# Patient Record
Sex: FEMALE | Race: WHITE | Employment: UNEMPLOYED | URBAN - METROPOLITAN AREA
[De-identification: names, ages, dates, MRNs, and addresses within clinical notes are randomized per-mention and may not be internally consistent; named-entity substitution may affect disease eponyms.]

---

## 2017-05-08 ENCOUNTER — TELEPHONE (OUTPATIENT)
Dept: HEMATOLOGY | Age: 62
End: 2017-05-08

## 2018-05-01 ENCOUNTER — OFFICE VISIT (OUTPATIENT)
Dept: HEMATOLOGY | Age: 63
End: 2018-05-01

## 2018-05-01 ENCOUNTER — HOSPITAL ENCOUNTER (OUTPATIENT)
Dept: LAB | Age: 63
Discharge: HOME | End: 2018-05-01
Payer: COMMERCIAL

## 2018-05-01 VITALS
HEART RATE: 66 BPM | DIASTOLIC BLOOD PRESSURE: 41 MMHG | TEMPERATURE: 98.7 F | SYSTOLIC BLOOD PRESSURE: 96 MMHG | RESPIRATION RATE: 20 BRPM | HEIGHT: 65 IN | OXYGEN SATURATION: 91 %

## 2018-05-01 DIAGNOSIS — K74.60 CIRRHOSIS OF LIVER WITHOUT ASCITES, UNSPECIFIED HEPATIC CIRRHOSIS TYPE (HCC): ICD-10-CM

## 2018-05-01 DIAGNOSIS — K74.60 CIRRHOSIS OF LIVER WITHOUT ASCITES, UNSPECIFIED HEPATIC CIRRHOSIS TYPE (HCC): Primary | ICD-10-CM

## 2018-05-01 PROBLEM — D33.2 BRAIN TUMOR (BENIGN) (HCC): Status: ACTIVE | Noted: 2018-05-01

## 2018-05-01 LAB
ALBUMIN SERPL-MCNC: 2.8 G/DL (ref 3.4–5)
ALBUMIN/GLOB SERPL: 1 {RATIO} (ref 0.8–1.7)
ALP SERPL-CCNC: 263 U/L (ref 45–117)
ALT SERPL-CCNC: 27 U/L (ref 13–56)
AMMONIA PLAS-SCNC: 54 UMOL/L (ref 11–32)
ANION GAP SERPL CALC-SCNC: 7 MMOL/L (ref 3–18)
AST SERPL-CCNC: 26 U/L (ref 15–37)
BASOPHILS # BLD: 0 K/UL (ref 0–0.06)
BASOPHILS NFR BLD: 0 % (ref 0–2)
BILIRUB DIRECT SERPL-MCNC: 0.4 MG/DL (ref 0–0.2)
BILIRUB SERPL-MCNC: 1 MG/DL (ref 0.2–1)
BUN SERPL-MCNC: 15 MG/DL (ref 7–18)
BUN/CREAT SERPL: 15 (ref 12–20)
CALCIUM SERPL-MCNC: 8.4 MG/DL (ref 8.5–10.1)
CHLORIDE SERPL-SCNC: 107 MMOL/L (ref 100–108)
CO2 SERPL-SCNC: 27 MMOL/L (ref 21–32)
CREAT SERPL-MCNC: 1.02 MG/DL (ref 0.6–1.3)
DIFFERENTIAL METHOD BLD: ABNORMAL
EOSINOPHIL # BLD: 0.4 K/UL (ref 0–0.4)
EOSINOPHIL NFR BLD: 5 % (ref 0–5)
ERYTHROCYTE [DISTWIDTH] IN BLOOD BY AUTOMATED COUNT: 14.9 % (ref 11.6–14.5)
GLOBULIN SER CALC-MCNC: 2.9 G/DL (ref 2–4)
GLUCOSE SERPL-MCNC: 77 MG/DL (ref 74–99)
HCT VFR BLD AUTO: 38.6 % (ref 35–45)
HGB BLD-MCNC: 12.6 G/DL (ref 12–16)
INR PPP: 1.2 (ref 0.8–1.2)
LYMPHOCYTES # BLD: 1.5 K/UL (ref 0.9–3.6)
LYMPHOCYTES NFR BLD: 20 % (ref 21–52)
MCH RBC QN AUTO: 32 PG (ref 24–34)
MCHC RBC AUTO-ENTMCNC: 32.6 G/DL (ref 31–37)
MCV RBC AUTO: 98 FL (ref 74–97)
MONOCYTES # BLD: 0.6 K/UL (ref 0.05–1.2)
MONOCYTES NFR BLD: 8 % (ref 3–10)
NEUTS SEG # BLD: 5.2 K/UL (ref 1.8–8)
NEUTS SEG NFR BLD: 67 % (ref 40–73)
PLATELET # BLD AUTO: 78 K/UL (ref 135–420)
PMV BLD AUTO: 10.3 FL (ref 9.2–11.8)
POTASSIUM SERPL-SCNC: 4.2 MMOL/L (ref 3.5–5.5)
PROT SERPL-MCNC: 5.7 G/DL (ref 6.4–8.2)
PROTHROMBIN TIME: 14.6 SEC (ref 11.5–15.2)
RBC # BLD AUTO: 3.94 M/UL (ref 4.2–5.3)
SODIUM SERPL-SCNC: 141 MMOL/L (ref 136–145)
WBC # BLD AUTO: 7.7 K/UL (ref 4.6–13.2)

## 2018-05-01 PROCEDURE — 85025 COMPLETE CBC W/AUTO DIFF WBC: CPT | Performed by: NURSE PRACTITIONER

## 2018-05-01 PROCEDURE — 36415 COLL VENOUS BLD VENIPUNCTURE: CPT | Performed by: NURSE PRACTITIONER

## 2018-05-01 PROCEDURE — 80076 HEPATIC FUNCTION PANEL: CPT | Performed by: NURSE PRACTITIONER

## 2018-05-01 PROCEDURE — 82107 ALPHA-FETOPROTEIN L3: CPT | Performed by: NURSE PRACTITIONER

## 2018-05-01 PROCEDURE — 80048 BASIC METABOLIC PNL TOTAL CA: CPT | Performed by: NURSE PRACTITIONER

## 2018-05-01 PROCEDURE — 82140 ASSAY OF AMMONIA: CPT | Performed by: NURSE PRACTITIONER

## 2018-05-01 PROCEDURE — 85610 PROTHROMBIN TIME: CPT | Performed by: NURSE PRACTITIONER

## 2018-05-01 RX ORDER — LACTULOSE 10 G/15ML
30 SOLUTION ORAL EVERY 6 HOURS
COMMUNITY

## 2018-05-01 NOTE — MR AVS SNAPSHOT
[facility] [place] [address] 
[phone] Patient: Wilene Rubinstein MRN: [id number] TXJ:[id number] Visit Information Date & Time Provider Department Dept. Phone Encounter #  
 5/1/2018  3:30 PM LEVY [address] [id number] Follow-up Instructions Return in about 3 months (around 8/1/2018). Upcoming Health Maintenance Date Due HEMOGLOBIN A1C Q6M 1955 LIPID PANEL Q1 1955 FOOT EXAM Q1 9/29/1965 MICROALBUMIN Q1 9/29/1965 EYE EXAM RETINAL OR DILATED Q1 9/29/1965 Pneumococcal 19-64 Medium Risk (1 of 1 - PPSV23) 9/29/1974 DTaP/Tdap/Td series (1 - Tdap) 9/29/1976 PAP AKA CERVICAL CYTOLOGY 9/29/1976 BREAST CANCER SCRN MAMMOGRAM 9/29/2005 FOBT Q 1 YEAR AGE 50-75 9/29/2005 ZOSTER VACCINE AGE 60> 7/29/2015 MEDICARE YEARLY EXAM 4/12/2018 Influenza Age 5 to Adult 8/1/2018 Allergies as of 5/1/2018  Review Complete On: 5/1/2018 By: Florencia Og Severity Noted Reaction Type Reactions Betadine [Povidone-iodine]  02/06/2012    Rash, Itching Iodine  02/06/2012    Unknown (comments) Ipratropium-albuterol  02/06/2012    Unknown (comments) Morphine  02/06/2012    Other (comments) Wheezing/SOB Penicillins  02/06/2012    Unknown (comments) Shellfish Containing Products  02/06/2012    Unknown (comments) Sulfa (Sulfonamide Antibiotics)  02/06/2012    Other (comments) Current Immunizations  Never Reviewed No immunizations on file. Not reviewed this visit You Were Diagnosed With   
  
 Codes Comments Cirrhosis of liver without ascites, unspecified hepatic cirrhosis type ([facility] [address]    -  Primary ICD-10-CM: K74.60 ICD-9-CM: 571.5 Vitals  BP Pulse Temp Resp Height(growth percentile) SpO2  
 96/41 (BP 1 Location: Right arm, BP Patient Position: Sitting) 66 98.7 °F (37.1 °C) (Tympanic) 20 5' 5\" (1.651 m) 91% OB Status Smoking Status Hysterectomy Current Every Day Smoker Preferred Pharmacy Pharmacy Name Phone [facility] [phone] Your Updated Medication List  
  
   
This list is accurate as of 5/1/18  4:19 PM.  Always use your most recent med list.  
  
  
  
  
 ALEVE 220 mg Cap Generic drug:  naproxen sodium Take  by mouth. ARICEPT 10 mg tablet Generic drug:  donepezil Take 10 mg by mouth nightly. BENTYL 20 mg tablet Generic drug:  dicyclomine Take 20 mg by mouth every six (6) hours. cloNIDine HCl 0.1 mg tablet Commonly known as:  CATAPRES Take  by mouth two (2) times a day. CYMBALTA 30 mg capsule Generic drug:  DULoxetine Take 30 mg by mouth daily. fentaNYL 100 mcg/hr PATCH Commonly known as:  DURAGESIC  
1 Patch by TransDERmal route every seventy-two (72) hours. furosemide 20 mg tablet Commonly known as:  LASIX Take 1 Tab by mouth two (2) times a day. KEPPRA 250 mg tablet Generic drug:  levETIRAcetam  
Take  by mouth two (2) times a day. KlonoPIN 0.5 mg tablet Generic drug:  clonazePAM  
Take  by mouth nightly as needed. lactulose 10 gram/15 mL (15 mL) Soln Take 30 mL by mouth. NEURONTIN 300 mg capsule Generic drug:  gabapentin Take 300 mg by mouth three (3) times daily. NexIUM 40 mg capsule Generic drug:  esomeprazole Take  by mouth daily. RESTORIL 15 mg capsule Generic drug:  temazepam  
Take  by mouth nightly as needed. SEROquel 100 mg tablet Generic drug:  QUEtiapine Take 50 mg by mouth two (2) times a day. VALIUM 10 mg tablet Generic drug:  diazePAM  
Take 10 mg by mouth every six (6) hours as needed. Follow-up Instructions Return in about 3 months (around 8/1/2018). To-Do List   
 05/01/2018 Lab:  AFP WITH AFP-L3% 05/01/2018 Lab:  AMMONIA   
  
 05/01/2018 Lab:  CBC WITH AUTOMATED DIFF   
  
 05/01/2018 Lab:  HEPATIC FUNCTION PANEL   
  
 05/01/2018 Lab:  METABOLIC PANEL, BASIC   
  
 05/01/2018 Lab:  PROTHROMBIN TIME + INR Introducing [facility]! Denisse Gamble introduces [employer] patient portal. Now you can access parts of your medical record, email your doctor's office, and request medication refills online. 1. In your internet browser, go to [email or web address] 2. Click on the First Time User? Click Here link in the Sign In box. You will see the New Member Sign Up page. 3. Enter your [employer] Access Code exactly as it appears below. You will not need to use this code after youve completed the sign-up process. If you do not sign up before the expiration date, you must request a new code. · [employer] Access Code: [email or web address] Expires: 7/30/2018  4:16 PM 
 
4. Enter the last four digits of your Social Security Number (xxxx) and Date of Birth (mm/dd/yyyy) as indicated and click Submit. You will be taken to the next sign-up page. 5. Create a [employer] ID. This will be your [employer] login ID and cannot be changed, so think of one that is secure and easy to remember. 6. Create a [employer] password. You can change your password at any time. 7. Enter your Password Reset Question and Answer. This can be used at a later time if you forget your password. 8. Enter your e-mail address. You will receive e-mail notification when new information is available in [address] 9. Click Sign Up. You can now view and download portions of your medical record. 10. Click the Download Summary menu link to download a portable copy of your medical information. If you have questions, please visit the Frequently Asked Questions section of the [employer] website. Remember, [employer] is NOT to be used for urgent needs. For medical emergencies, dial 911. Now available from your iPhone and Android! Please provide this summary of care documentation to your next provider. Your primary care clinician is listed as Zena Cordova. If you have any questions after today's visit, please call [phone].

## 2018-05-01 NOTE — PROGRESS NOTES
70 Reji Quintanilla MD, [address], [place], [place]       Cristina Mendez, LEVY Voss Cera, DIMPLE Saleh, [facility]   Tyson Catalan, LEVY Santa DepUNM [facility] 136    at [address]    [address] [place] [address], [address] [place] [address], [facility] 22.    [phone]    FAX: [address] [place] [address] Avenue    [address], [address], [address] [place] [address],[address]    [address] [place] [address] Street: [phone]           Patient Care Team:  Keisha Rangel MD as PCP - General (Internal Medicine)      Problem List  Never Reviewed          Codes Class Noted    Brain tumor (benign) [facility]) ICD-10-CM: D33.2  ICD-9-CM: 225.0  5/1/2018        Seizures ([facility] ICD-10-CM: R56.9  ICD-9-CM: 780.39  10/26/2016        Diabetes mellitus type 2, controlled ([facility] ICD-10-CM: E11.9  ICD-9-CM: 250.00  10/26/2016        HTN (hypertension) ICD-10-CM: I10  ICD-9-CM: 401.9  10/26/2016        Cirrhosis ([facility] ICD-10-CM: K74.60  ICD-9-CM: 571.5  10/26/2016        Fatigue ICD-10-CM: R53.83  ICD-9-CM: 780.79  10/26/2016        Depression ICD-10-CM: F32.9  ICD-9-CM: 839  10/26/2016        Bipolar 1 disorder ([facility] ICD-10-CM: F31.9  ICD-9-CM: 296.7  10/26/2016        Nocturia ICD-10-CM: R35.1  ICD-9-CM: 788.43  2/7/2012        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   The active problem list, all pertinent past medical history, medications, liver histology, endoscopic studies, radiologic findings and laboratory findings related to the liver disorder were reviewed with the patient. The patient has not been seen here since 10/2016. That was the only other time she has been here. The patient is a 58 y.o.   female who was first found to have chronic liver disease and cirrhosis in 2013. The patient has not developed ascites. Medication list is not clear. Edema persists with aldactone 25 mg. The patient may have developed hepatic encephalopathy. Hepatic encephalopathy is currently controlled with  lactulose     The patient is having significant diarrhea when taking lactulose. The patient does not have esophageal or gastric varices. She has significant portal hypertension and has been anemic as a result. This is improved with beta blocker and iron. The most recent laboratory studies are listed below. The patient notes fatigue, weight gain, lower extremity swelling. The patient has limitations in functional activities secondary to these symptoms. She has a significant medical history to include neuropathy in both hands and arms. She has had brain surgery to remove benign tumors in the past.  She suffered from seizures since the operation. ALLERGIES  Allergies   Allergen Reactions    Betadine [Povidone-Iodine] Rash and Itching    Iodine Unknown (comments)    Ipratropium-Albuterol Unknown (comments)    Morphine Other (comments)     Wheezing/SOB    Penicillins Unknown (comments)    Shellfish Containing Products Unknown (comments)    Sulfa (Sulfonamide Antibiotics) Other (comments)       MEDICATIONS  Current Outpatient Prescriptions   Medication Sig    furosemide (LASIX) 20 mg tablet Take 1 Tab by mouth two (2) times a day. (Patient taking differently: Take 20 mg by mouth daily.)    levETIRAcetam (KEPPRA) 250 mg tablet Take  by mouth two (2) times a day.  esomeprazole (NEXIUM) 40 mg capsule Take  by mouth daily.       fentaNYL (DURAGESIC) 100 mcg/hr PATCH 1 Patch by TransDERmal route every seventy-two (72) hours.  temazepam (RESTORIL) 15 mg capsule Take  by mouth nightly as needed.  donepezil (ARICEPT) 10 mg tablet Take 10 mg by mouth nightly.  dicyclomine (BENTYL) 20 mg tablet Take 20 mg by mouth every six (6) hours.  cloNIDine (CATAPRES) 0.1 mg tablet Take  by mouth two (2) times a day.  DULoxetine (CYMBALTA) 30 mg capsule Take 30 mg by mouth daily.  gabapentin (NEURONTIN) 300 mg capsule Take 300 mg by mouth three (3) times daily.  QUEtiapine (SEROQUEL) 100 mg tablet Take 50 mg by mouth two (2) times a day.  naproxen sodium (ALEVE) 220 mg Cap Take  by mouth.  diazepam (VALIUM) 10 mg tablet Take 10 mg by mouth every six (6) hours as needed.  clonazePAM (KLONOPIN) 0.5 mg tablet Take  by mouth nightly as needed. No current facility-administered medications for this visit. SYSTEM REVIEW NOT RELATED TO LIVER DISEASE OR REVIEWED ABOVE:  Constitution systems: Negative for fever, chills, +weight gain,   Eyes: Negative for visual changes. ENT: Negative for sore throat, painful swallowing. Respiratory: Negative for cough, hemoptysis, SOB. Cardiology: Negative for chest pain, palpitations. GI:  Negative for constipation or ++diarrhea. : Negative for urinary frequency, dysuria, hematuria, nocturia. Skin: Negative for rash. Hematology: Negative for easy bruising, blood clots. Musculo-skelatal:++for back pain, muscle pain, weakness. Neurologic: Negative for headaches, dizziness, vertigo, memory problems not related to HE. Psychology: Negative for anxiety, depression. FAMILY HISTORY:  There is no family history of liver disease. SOCIAL HISTORY:  The patient is . The patient has 3 children ( 2 living)  The patient smokes 4 cigarettes a day. The patient has never consumed significant amounts of alcohol. The patient currently receiving disability. She lives in a nursing home.         PHYSICAL EXAMINATION:  Visit Vitals    BP 96/41 (BP 1 Location: Right arm, BP Patient Position: Sitting)    Pulse 66    Temp 98.7 °F (37.1 °C) (Tympanic)    Resp 20    Ht 5' 5\" (1.651 m)    SpO2 91%     General: No acute distress. Eyes: Sclera anicteric. ENT: No oral lesions. Thyroid normal.  Nodes: No adenopathy. Skin: No spider angiomata. No jaundice. No palmar erythema. Respiratory: Lungs clear to auscultation. Cardiovascular: Regular heart rate. No murmurs. No JVD. Abdomen: Soft non-tender. Liver size normal to percussion/palpation. Spleen not palpable. No obvious ascites. Extremities: 2 + LE edema. No muscle wasting. No gross arthritic changes. Neurologic: Alert and oriented. Cranial nerves grossly intact. No asterixis. LABORATORY STUDIES:  Liver [place] of [address] Units 10/26/2016   WBC 4.6 - 13.2 K/uL 6.8   ANC 1.8 - 8.0 K/UL 4.4   HGB 12.0 - 16.0 g/dL 12.4    - 420 K/uL 81 (L)   INR 0.8 - 1.2   1.2   AST 15 - 37 U/L 37   ALT 13 - 56 U/L 34   Alk Phos 45 - 117 U/L 164 (H)   Bili, Total 0.2 - 1.0 MG/DL 0.6   Bili, Direct 0.0 - 0.2 MG/DL 0.2   Albumin 3.4 - 5.0 g/dL 2.9 (L)   BUN 7.0 - 18 MG/DL 9   Creat 0.6 - 1.3 MG/DL 0.82   Na 136 - 145 mmol/L 146 (H)   K 3.5 - 5.5 mmol/L 4.1   Cl 100 - 108 mmol/L 110 (H)   CO2 21 - 32 mmol/L 28   Glucose 74 - 99 mg/dL 147 (H)   Ammonia 11 - 32 UMOL/L 92 (H)       SEROLOGIES:  EMERSON (-)  ASMA (-)  AMA (-)  Ceruloplasm (-)  Ferritin normal  Iron saturation normal    LIVER HISTOLOGY:  Not available or performed    ENDOSCOPIC PROCEDURES:  12/2015 COLO Dr. Tami Kilgore + polyps ( TA)  1/2016 EGD Dr. DALEY PHTN no varices  8/2016 EGD Dr. THUY MAS per patient    RADIOLOGY:  07/2016 CT Abd/ Pelvis:  THE LIVER IS A MILD LOW-ATTENUATION FOR MILD FATTY INFILTRATION. THE  GALLBLADDER HAS BEEN SURGICALLY REMOVED. A 3.4 X 2.3 CM SUBTLE HYPODENSITY IS NOTED IN THE POSTEROLATERAL ASPECT  OF THE SPLEEN. ULTRASOUND CORRELATION IS RECOMMENDED.   THE ADRENAL GLANDS AND PANCREAS ARE UNREMARKABLE. THE KIDNEYS URETERS AND BLADDER ARE NORMAL. THE PELVIC VISCERA IS UNREMARKABLE STATUS POST HYSTERECTOMY    OTHER TESTING:  Not available or performed    ASSESSMENT AND PLAN:  Cirrhosis secondary to  Cryptogenic or HOPKINS. Labs are out of date. Will perform laboratory testing to monitor liver function and degree of liver injury. This will include hepatic panel, a CBC w/ diff, a BMP, a PT/INR, an ammonia level, and an AFP-L3%. Complications of cirrhosis were discussed in detail. We discussed thrombocytopenia, portal hypertension, varices, GI bleeding, peripheral edema, ascites, hepatic encephalopathy, and hepatocellular carcinoma. We discussed the need for follow ups on a regular basis, at 3 month intervals to monitor for complications. We discussed the need for every 6 month liver imaging studies. Both the patient and her sister are very poor historians and it is not possible to determine her medication regime. A release was signed so that we can get her medical information from [facility] in [place], [place]. Will update MELD today. The patient dose not require liver transplant evaluation, but I suspect she would be a sub optimal candidate due to debility and health history. Lower extremity edemais persistent despite current dose of diuretics. EGD is being performed on a regular basis  By Dr. Xavier Sharpe. NO varices. She will continue beta blocker for PHTN. HE is is controlled on current doses of lactulose. Probiotic was also encouraged. The patient was directed to continue all current medications at the current dosages. There are no contraindications for the patient to take any medications that are necessary for treatment of other medical issues. Thrombocytopenia secondary to cirrhosis. There is no evidence of overt bleeding. There is no indication for platelet transfusions or pharmacologic treatment to increase the platelet count.     Anemia from chronic GI blood loss from portal hypertension and iron deficiency. Bone density to assess for osteoporosis has not been performed. The need for vaccination against viral hepatitis A and B will be assessed with serologic and instituted as appropriate. [address] screening has recently been performed and does not suggest [address] Ultrasound was ordered today. All of the above issues were discussed with the patient. All questions were answered. 35 minutes total time spent with this patient with more than 50% of this time spent counseling and coordinating care as described above. [address] [place] [address] in 3 months.     BENIGNO Galeana-C  Liver [place] [facility]  [address], [address] [place] [address] Alondra Cornelius, [facility] [facility]   [phone]

## 2018-05-03 LAB
AFP L3 MFR SERPL: 8.2 % (ref 0–9.9)
AFP SERPL-MCNC: 7.3 NG/ML (ref 0–8)

## 2018-05-26 PROBLEM — K92.2 GI BLEED: Status: ACTIVE | Noted: 2018-05-26

## 2018-05-26 PROBLEM — K76.82 HEPATIC ENCEPHALOPATHY: Status: ACTIVE | Noted: 2018-05-26

## 2018-05-26 PROBLEM — R56.9 SEIZURE (HCC): Status: ACTIVE | Noted: 2018-05-26

## 2018-06-07 ENCOUNTER — TELEPHONE (OUTPATIENT)
Dept: HEMATOLOGY | Age: 63
End: 2018-06-07

## 2018-06-14 ENCOUNTER — TELEPHONE (OUTPATIENT)
Dept: HEMATOLOGY | Age: 63
End: 2018-06-14

## 2018-06-14 NOTE — TELEPHONE ENCOUNTER
Patient sister states her and NP Juan Vizcarra had a convo on yesterday 06/13/18 in regards to patient , she gave the wrong contact information and wanted to give you the correct info as follows:      Natalya Rodriguez is the NP  Phn# [phone] Fax# [phone]    And    The Dr. Sivakumar Stoll ordered the wheel chair for her and won't order the hospital bed is  Dr. Myrtle Hoover  Phn# [phone]

## 2018-07-05 PROBLEM — N28.9 RENAL INSUFFICIENCY: Status: ACTIVE | Noted: 2018-07-05

## 2018-07-05 PROBLEM — N30.00 ACUTE CYSTITIS: Status: ACTIVE | Noted: 2018-07-05

## 2018-07-07 PROBLEM — G93.41 METABOLIC ENCEPHALOPATHY: Status: ACTIVE | Noted: 2018-05-26

## 2018-07-18 ENCOUNTER — TELEPHONE (OUTPATIENT)
Dept: HEMATOLOGY | Age: 63
End: 2018-07-18

## 2018-07-18 NOTE — TELEPHONE ENCOUNTER
Patient guardian, Gregg Rick, [phone], would like you to give her a call.  States pt is having issues with \"one of her medicine\" and wants to know can she stop the medication due to stomach pain and try Aloe vera and milk fissel